# Patient Record
Sex: MALE | Race: WHITE
[De-identification: names, ages, dates, MRNs, and addresses within clinical notes are randomized per-mention and may not be internally consistent; named-entity substitution may affect disease eponyms.]

---

## 2018-07-03 ENCOUNTER — HOSPITAL ENCOUNTER (EMERGENCY)
Dept: HOSPITAL 25 - ED | Age: 23
Discharge: HOME | End: 2018-07-03
Payer: SELF-PAY

## 2018-07-03 VITALS — DIASTOLIC BLOOD PRESSURE: 55 MMHG | SYSTOLIC BLOOD PRESSURE: 138 MMHG

## 2018-07-03 DIAGNOSIS — Z88.8: ICD-10-CM

## 2018-07-03 DIAGNOSIS — Z88.3: ICD-10-CM

## 2018-07-03 DIAGNOSIS — F17.210: ICD-10-CM

## 2018-07-03 DIAGNOSIS — R50.9: Primary | ICD-10-CM

## 2018-07-03 DIAGNOSIS — Z88.5: ICD-10-CM

## 2018-07-03 LAB
BASOPHILS # BLD AUTO: 0 10^3/UL (ref 0–0.2)
EOSINOPHIL # BLD AUTO: 0 10^3/UL (ref 0–0.6)
HCT VFR BLD AUTO: 41 % (ref 42–52)
HGB BLD-MCNC: 14 G/DL (ref 14–18)
INR PPP/BLD: 1.04 (ref 0.77–1.02)
LYMPHOCYTES # BLD AUTO: 0.4 10^3/UL (ref 1–4.8)
MCH RBC QN AUTO: 29 PG (ref 27–31)
MCHC RBC AUTO-ENTMCNC: 34 G/DL (ref 31–36)
MCV RBC AUTO: 86 FL (ref 80–94)
MONOCYTES # BLD AUTO: 0.9 10^3/UL (ref 0–0.8)
NEUTROPHILS # BLD AUTO: 5.5 10^3/UL (ref 1.5–7.7)
NRBC # BLD AUTO: 0 10^3/UL
NRBC BLD QL AUTO: 0
PLATELET # BLD AUTO: 150 10^3/UL (ref 150–450)
RBC # BLD AUTO: 4.8 10^6/UL (ref 4–5.4)
WBC # BLD AUTO: 6.8 10^3/UL (ref 3.5–10.8)

## 2018-07-03 PROCEDURE — 86618 LYME DISEASE ANTIBODY: CPT

## 2018-07-03 PROCEDURE — 80307 DRUG TEST PRSMV CHEM ANLYZR: CPT

## 2018-07-03 PROCEDURE — 84484 ASSAY OF TROPONIN QUANT: CPT

## 2018-07-03 PROCEDURE — 99283 EMERGENCY DEPT VISIT LOW MDM: CPT

## 2018-07-03 PROCEDURE — 83605 ASSAY OF LACTIC ACID: CPT

## 2018-07-03 PROCEDURE — 83735 ASSAY OF MAGNESIUM: CPT

## 2018-07-03 PROCEDURE — 86140 C-REACTIVE PROTEIN: CPT

## 2018-07-03 PROCEDURE — 36415 COLL VENOUS BLD VENIPUNCTURE: CPT

## 2018-07-03 PROCEDURE — 80053 COMPREHEN METABOLIC PANEL: CPT

## 2018-07-03 PROCEDURE — 81015 MICROSCOPIC EXAM OF URINE: CPT

## 2018-07-03 PROCEDURE — 85610 PROTHROMBIN TIME: CPT

## 2018-07-03 PROCEDURE — 81003 URINALYSIS AUTO W/O SCOPE: CPT

## 2018-07-03 PROCEDURE — 85025 COMPLETE CBC W/AUTO DIFF WBC: CPT

## 2018-07-03 NOTE — ED
HPI Febrile Illness





- HPI Summary


HPI Summary: 


Patient is an otherwise healthy 23-year-old male presenting to the ED with 

complaint of fever, diffuse myalgias and dizziness 3 days.  He states he has 

been outside frequently and has had multiple ticks and mosquitos recently.  

Fever highest at 102.  Has been taking ibuprofen without relief of symptoms.  

Endorses nausea, however denies vomiting, diarrhea, constipation, abdominal pain

, urinary symptoms.  He denies any neck stiffness, however endorses some 

photophobia.  Reduced PO intake.








- History of Current Complaint


Chief Complaint: EDFever


Time Seen by Provider: 07/03/18 12:22


Hx Obtained From: Patient


Onset/Duration: Started Hours Ago


Timing: Constant


Initial Severity: Moderate


Current Severity: Moderate


Pain Intensity: 7


Pain Scale Used: 0-10 Numeric


Aggravating Factors: Nothing


Alleviating Factors: Nothing


Associated Signs and Symptoms: Arthralgia, Headache, Nausea


Related History: Recent Tick Bite





- Risk Factors


Pseudomonas Risk Factors: Negative


Serious Bacterial Infection Risk Factors: Negative





- Allergy/Home Medications


Allergies/Adverse Reactions: 


 Allergies











Allergy/AdvReac Type Severity Reaction Status Date / Time


 


codeine Allergy  Vomiting Verified 07/03/18 12:29


 


erythromycin base Allergy  Vomiting Verified 07/03/18 12:29





[From Pediazole]     


 


sulfisoxazole Allergy  Vomiting Verified 07/03/18 12:29





[From Pediazole]     


 


mint Allergy  Hives Uncoded 07/03/18 12:29











Home Medications: 


 Home Medications





Multivitamin [Multiple Vitamins] 1 tab PO DAILY 07/03/18 [History Confirmed 07/ 03/18]











PMH/Surg Hx/FS Hx/Imm Hx


Previously Healthy: Yes





- Immunization History


Hx Pertussis Vaccination: No


Immunizations Up to Date: Unable to Obtain/Confirm


Infectious Disease History: No


Infectious Disease History: 


   Denies: Traveled Outside the [place] in Last 30 Days





- Family History


Known Family History: Positive: Unknown





- Social History


Occupation: Unemployed


Lives: With Family


Alcohol Use: Occasionally


Hx Substance Use: No


Substance Use Type: Reports: None


Hx Tobacco Use: Yes


Smoking Status (MU): Light Every Day Tobacco Smoker


Type: Cigarettes


Amount Used/How Often: 8 CIG/DAY


Have You Smoked in the Last Year: Yes





Review of Systems


Positive: Fever.  Negative: Chills, Fatigue, Skin Diaphoresis


Positive: Photophobia.  Negative: Blurred Vision, Diplopia


Negative: Palpitations, Chest Pain


Negative: Shortness Of Breath, Cough


Positive: Nausea


Positive: Myalgia


Skin: Negative


Positive: Other - no rash noted


Positive: Headache, Weakness


Psychological: Normal


All Other Systems Reviewed And Are Negative: Yes





Physical Exam


Triage Information Reviewed: Yes


Vital Signs On Initial Exam: 


 Initial Vitals











Temp Pulse Resp BP Pulse Ox


 


 99.6 F   79   19   151/81   98 


 


 07/03/18 12:17  07/03/18 12:17  07/03/18 12:17  07/03/18 12:17  07/03/18 12:17











Vital Signs Reviewed: Yes


Appearance: Positive: Well-Appearing, Well-Nourished


Skin: Positive: Warm, Skin Color Reflects Adequate Perfusion


Head/Face: Positive: Normal Head/Face Inspection


Eyes: Positive: EOMI, CEM


Neck: Positive: Nontender


Respiratory/Lung Sounds: Positive: Clear to Auscultation, Breath Sounds Present


Cardiovascular: Positive: Normal, RRR


Musculoskeletal: Positive: Normal, Strength/ROM Intact


Neurological: Positive: Speech Normal


Psychiatric: Positive: Normal, Affect/Mood Appropriate


AVPU Assessment: Alert





Diagnostics





- Vital Signs


 Vital Signs











  Temp Pulse Resp BP Pulse Ox


 


 07/03/18 13:07   68    97


 


 07/03/18 12:54   68   160/79  97


 


 07/03/18 12:24   82   146/83  97


 


 07/03/18 12:17  99.6 F  79  19  151/81  98














- Laboratory


Lab Results: 


 Lab Results











  07/03/18 07/03/18 07/03/18 Range/Units





  12:45 12:45 12:45 


 


WBC  6.8    (3.5-10.8)  10^3/ul


 


RBC  4.80    (4.00-5.40)  10^6/ul


 


Hgb  14.0    (14.0-18.0)  g/dl


 


Hct  41 L    (42-52)  %


 


MCV  86    (80-94)  fL


 


MCH  29    (27-31)  pg


 


MCHC  34    (31-36)  g/dl


 


RDW  13    (10.5-15)  %


 


Plt Count  150    (150-450)  10^3/ul


 


MPV  8.6    (7.4-10.4)  um3


 


Neut % (Auto)  80.0    (38-83)  %


 


Lymph % (Auto)  6.5 L    (25-47)  %


 


Mono % (Auto)  12.9 H    (0-7)  %


 


Eos % (Auto)  0.3    (0-6)  %


 


Baso % (Auto)  0.3    (0-2)  %


 


Absolute Neuts (auto)  5.5    (1.5-7.7)  10^3/ul


 


Absolute Lymphs (auto)  0.4 L    (1.0-4.8)  10^3/ul


 


Absolute Monos (auto)  0.9 H    (0-0.8)  10^3/ul


 


Absolute Eos (auto)  0    (0-0.6)  10^3/ul


 


Absolute Basos (auto)  0    (0-0.2)  10^3/ul


 


Absolute Nucleated RBC  0    10^3/ul


 


Nucleated RBC %  0    


 


INR (Anticoag Therapy)   1.04 H   (0.77-1.02)  


 


Sodium    139  (135-145)  mmol/L


 


Potassium    4.0  (3.5-5.0)  mmol/L


 


Chloride    108  (101-111)  mmol/L


 


Carbon Dioxide    26  (22-32)  mmol/L


 


Anion Gap    5  (2-11)  mmol/L


 


BUN    11  (6-24)  mg/dL


 


Creatinine    0.92  (0.67-1.17)  mg/dL


 


Est GFR ( Amer)    122.3  (>60)  


 


Est GFR (Non-Af Amer)    101.1  (>60)  


 


BUN/Creatinine Ratio    12.0  (8-20)  


 


Glucose    100  ()  mg/dL


 


Lactic Acid     (0.5-2.0)  mmol/L


 


Calcium    9.2  (8.6-10.3)  mg/dL


 


Magnesium    2.0  (1.9-2.7)  mg/dL


 


Total Bilirubin    0.60  (0.2-1.0)  mg/dL


 


AST    21  (13-39)  U/L


 


ALT    18  (7-52)  U/L


 


Alkaline Phosphatase    53  ()  U/L


 


Troponin I    0.00  (<0.04)  ng/mL


 


C-Reactive Protein    20.99 H  (<8.01)  mg/L


 


Total Protein    6.6  (6.4-8.9)  g/dL


 


Albumin    4.1  (3.2-5.2)  g/dL


 


Globulin    2.5  (2-4)  g/dL


 


Albumin/Globulin Ratio    1.6  (1-3)  


 


Urine Color     


 


Urine Appearance     


 


Urine pH     (5-9)  


 


Ur Specific Gravity     (1.010-1.030)  


 


Urine Protein     (Negative)  


 


Urine Ketones     (Negative)  


 


Urine Blood     (Negative)  


 


Urine Nitrate     (Negative)  


 


Urine Bilirubin     (Negative)  


 


Urine Urobilinogen     (Negative)  


 


Ur Leukocyte Esterase     (Negative)  


 


Urine WBC (Auto)     (Absent)  


 


Urine RBC (Auto)     (Absent)  


 


Urine Bacteria     (Absent)  


 


Urine Glucose     (Negative)  


 


Urine Opiates Screen     (None Detect)  


 


Ur Barbiturates Screen     (None Detect)  


 


Ur Phencyclidine Scrn     (None Detect)  


 


Ur Amphetamines Screen     (None Detect)  


 


U Benzodiazepines Scrn     (None Detect)  


 


Urine Cocaine Screen     (None Detect)  


 


U Cannabinoids Screen     (None Detect)  














  07/03/18 07/03/18 07/03/18 Range/Units





  12:45 13:02 13:02 


 


WBC     (3.5-10.8)  10^3/ul


 


RBC     (4.00-5.40)  10^6/ul


 


Hgb     (14.0-18.0)  g/dl


 


Hct     (42-52)  %


 


MCV     (80-94)  fL


 


MCH     (27-31)  pg


 


MCHC     (31-36)  g/dl


 


RDW     (10.5-15)  %


 


Plt Count     (150-450)  10^3/ul


 


MPV     (7.4-10.4)  um3


 


Neut % (Auto)     (38-83)  %


 


Lymph % (Auto)     (25-47)  %


 


Mono % (Auto)     (0-7)  %


 


Eos % (Auto)     (0-6)  %


 


Baso % (Auto)     (0-2)  %


 


Absolute Neuts (auto)     (1.5-7.7)  10^3/ul


 


Absolute Lymphs (auto)     (1.0-4.8)  10^3/ul


 


Absolute Monos (auto)     (0-0.8)  10^3/ul


 


Absolute Eos (auto)     (0-0.6)  10^3/ul


 


Absolute Basos (auto)     (0-0.2)  10^3/ul


 


Absolute Nucleated RBC     10^3/ul


 


Nucleated RBC %     


 


INR (Anticoag Therapy)     (0.77-1.02)  


 


Sodium     (135-145)  mmol/L


 


Potassium     (3.5-5.0)  mmol/L


 


Chloride     (101-111)  mmol/L


 


Carbon Dioxide     (22-32)  mmol/L


 


Anion Gap     (2-11)  mmol/L


 


BUN     (6-24)  mg/dL


 


Creatinine     (0.67-1.17)  mg/dL


 


Est GFR ( Amer)     (>60)  


 


Est GFR (Non-Af Amer)     (>60)  


 


BUN/Creatinine Ratio     (8-20)  


 


Glucose     ()  mg/dL


 


Lactic Acid  0.6    (0.5-2.0)  mmol/L


 


Calcium     (8.6-10.3)  mg/dL


 


Magnesium     (1.9-2.7)  mg/dL


 


Total Bilirubin     (0.2-1.0)  mg/dL


 


AST     (13-39)  U/L


 


ALT     (7-52)  U/L


 


Alkaline Phosphatase     ()  U/L


 


Troponin I     (<0.04)  ng/mL


 


C-Reactive Protein     (<8.01)  mg/L


 


Total Protein     (6.4-8.9)  g/dL


 


Albumin     (3.2-5.2)  g/dL


 


Globulin     (2-4)  g/dL


 


Albumin/Globulin Ratio     (1-3)  


 


Urine Color   Straw   


 


Urine Appearance   Clear   


 


Urine pH   7.0   (5-9)  


 


Ur Specific Gravity   1.005 L   (1.010-1.030)  


 


Urine Protein   Negative   (Negative)  


 


Urine Ketones   Negative   (Negative)  


 


Urine Blood   1+ A   (Negative)  


 


Urine Nitrate   Negative   (Negative)  


 


Urine Bilirubin   Negative   (Negative)  


 


Urine Urobilinogen   Negative   (Negative)  


 


Ur Leukocyte Esterase   Negative   (Negative)  


 


Urine WBC (Auto)   Absent   (Absent)  


 


Urine RBC (Auto)   Trace(0-2/hpf)   (Absent)  


 


Urine Bacteria   Absent   (Absent)  


 


Urine Glucose   Negative   (Negative)  


 


Urine Opiates Screen    None detected  (None Detect)  


 


Ur Barbiturates Screen    None detected  (None Detect)  


 


Ur Phencyclidine Scrn    None detected  (None Detect)  


 


Ur Amphetamines Screen    None detected  (None Detect)  


 


U Benzodiazepines Scrn    None detected  (None Detect)  


 


Urine Cocaine Screen    None detected  (None Detect)  


 


U Cannabinoids Screen    Presumptive positive A  (None Detect)  











Result Diagrams: 


 07/03/18 12:45





 07/03/18 12:45


Lab Statement: Any lab studies that have been ordered have been reviewed, and 

results considered in the medical decision making process.





Course/Dx





- Course


Course Of Treatment: Patient's evaluated for diffuse myalgias and fever.  He is 

afebrile on arrival.  Believes he may have Lyme disease and is requesting test.

  Denies any neck stiffness, however endorses photophobia.  He is given 

meclizine with relief.  Labs obtained and are unremarkable.  I have discussed 

the results with the patient and have agreed to call the patient with results.  

UA within normal limits.  Patient is encouraged follow up with Dr. Lieberman.





- Febrile Illness


Differential Diagnoses: Fever of Unknown Origin





- Diagnoses


Provider Diagnoses: 


 Fever, unknown origin








Discharge





- Sign-Out/Discharge


Documenting (check all that apply): Discharge/Admit/Transfer





- Discharge Plan


Condition: Stable


Disposition: HOME


Prescriptions: 


Meclizine TAB* [Antivert 12.5 TAB*] 25 mg PO TID PRN #15 tab


 PRN Reason: Dizziness


Patient Education Materials:  Lyme Disease (ED), Tick Bite (ED)


Referrals: 


Stewart Adams MD [Primary Care Provider] - 


Rickie LAYTON,Adam JACKSON [Medical Doctor] - 


Additional Instructions: 


I am giving you information on lyme disease


I will personally give you a call when the results are back


If they are positive, I will send a prescription to your pharmacy and have you 

 the urgent RX form at the  to receive the free prescription


Meclizine as needed for dizziness


Tylenol and ibuprofen use intermittently every 3 hours


Drink plenty of fluids








- Billing Disposition and Condition


Condition: STABLE


Disposition: Home